# Patient Record
Sex: FEMALE | Race: WHITE | Employment: FULL TIME | URBAN - METROPOLITAN AREA
[De-identification: names, ages, dates, MRNs, and addresses within clinical notes are randomized per-mention and may not be internally consistent; named-entity substitution may affect disease eponyms.]

---

## 2017-11-25 ENCOUNTER — APPOINTMENT (OUTPATIENT)
Dept: GENERAL RADIOLOGY | Facility: HOSPITAL | Age: 52
End: 2017-11-25
Attending: EMERGENCY MEDICINE
Payer: COMMERCIAL

## 2017-11-25 ENCOUNTER — HOSPITAL ENCOUNTER (EMERGENCY)
Facility: HOSPITAL | Age: 52
Discharge: HOME | End: 2017-11-25
Attending: EMERGENCY MEDICINE
Payer: COMMERCIAL

## 2017-11-25 VITALS
OXYGEN SATURATION: 94 % | BODY MASS INDEX: 31 KG/M2 | SYSTOLIC BLOOD PRESSURE: 142 MMHG | TEMPERATURE: 99 F | WEIGHT: 220 LBS | DIASTOLIC BLOOD PRESSURE: 84 MMHG | HEART RATE: 80 BPM | RESPIRATION RATE: 17 BRPM

## 2017-11-25 DIAGNOSIS — J20.9 ACUTE BRONCHITIS, UNSPECIFIED ORGANISM: Primary | ICD-10-CM

## 2017-11-25 PROCEDURE — 71020 XR CHEST PA + LAT CHEST (CPT=71020): CPT | Performed by: EMERGENCY MEDICINE

## 2017-11-25 PROCEDURE — 94640 AIRWAY INHALATION TREATMENT: CPT

## 2017-11-25 PROCEDURE — 99284 EMERGENCY DEPT VISIT MOD MDM: CPT

## 2017-11-25 RX ORDER — PREDNISONE 20 MG/1
60 TABLET ORAL ONCE
Status: COMPLETED | OUTPATIENT
Start: 2017-11-25 | End: 2017-11-25

## 2017-11-25 RX ORDER — PREDNISONE 20 MG/1
40 TABLET ORAL DAILY
Qty: 10 TABLET | Refills: 0 | Status: SHIPPED | OUTPATIENT
Start: 2017-11-25 | End: 2017-11-30

## 2017-11-25 RX ORDER — IPRATROPIUM BROMIDE AND ALBUTEROL SULFATE 2.5; .5 MG/3ML; MG/3ML
3 SOLUTION RESPIRATORY (INHALATION) ONCE
Status: COMPLETED | OUTPATIENT
Start: 2017-11-25 | End: 2017-11-25

## 2017-11-25 RX ORDER — BENZONATATE 100 MG/1
100 CAPSULE ORAL 3 TIMES DAILY PRN
Qty: 30 CAPSULE | Refills: 0 | Status: SHIPPED | OUTPATIENT
Start: 2017-11-25 | End: 2017-12-25

## 2017-11-25 RX ORDER — ALBUTEROL SULFATE 90 UG/1
2 AEROSOL, METERED RESPIRATORY (INHALATION) EVERY 6 HOURS PRN
Qty: 1 INHALER | Refills: 0 | Status: SHIPPED | OUTPATIENT
Start: 2017-11-25

## 2017-11-25 NOTE — RESPIRATORY THERAPY NOTE
Pt seen for Duoned BD tx. Scattered whz pre, Improved post. PF-200/215, MDI instruction and spacer device given.

## 2017-11-25 NOTE — ED PROVIDER NOTES
Patient Seen in: [facility] Emergency Department    History   Patient presents with:  Dyspnea LARRY SOB (respiratory)    Stated Complaint:     HPI    Patient is a 51-year-old female comes emergency room for evaluation of upper respiratory symptoms.   P Radiculitis 5/6/2009    LUE   • Sinusitis 1/4/2012   • Spinal stenosis in cervical region 5/19/2009   • Viral gastroenteritis 12/8/2005       Past Surgical History:  10/7/10: OTHER SURGICAL HISTORY      Comment: Cystoscopy, L RPG, L URS, L stent, Dr. oJse Del Angel PATIENT STATED HISTORY: (As transcribed by Technologist)  She has had a cough and fever for the past couple of days. She also states she has a runny nose. FINDINGS:  Normal heart size and pulmonary vascularity. No pleural effusion or pneumothorax.  No l visit.     Follow-up:  Gilberto Hammans, MD  [address] [phone]      As needed        Medications Prescribed:  Current Discharge Medication List    START taking these medications    Albuterol Sulfate HFA (PROAIR HFA) 1

## 2017-11-25 NOTE — ED INITIAL ASSESSMENT (HPI)
Patient arrives from home with c/o cough, nasal congestion, fever, and body aches starting on Wednesday

## 2024-11-15 ENCOUNTER — OFFICE VISIT (OUTPATIENT)
Dept: FAMILY MEDICINE CLINIC | Facility: CLINIC | Age: 59
End: 2024-11-15
Payer: COMMERCIAL

## 2024-11-15 VITALS
DIASTOLIC BLOOD PRESSURE: 86 MMHG | TEMPERATURE: 99 F | HEART RATE: 87 BPM | OXYGEN SATURATION: 98 % | SYSTOLIC BLOOD PRESSURE: 128 MMHG | RESPIRATION RATE: 16 BRPM | WEIGHT: 245 LBS

## 2024-11-15 DIAGNOSIS — L73.9 FOLLICULITIS: Primary | ICD-10-CM

## 2024-11-15 PROCEDURE — 3074F SYST BP LT 130 MM HG: CPT | Performed by: NURSE PRACTITIONER

## 2024-11-15 PROCEDURE — 3079F DIAST BP 80-89 MM HG: CPT | Performed by: NURSE PRACTITIONER

## 2024-11-15 PROCEDURE — 99202 OFFICE O/P NEW SF 15 MIN: CPT | Performed by: NURSE PRACTITIONER

## 2024-11-15 RX ORDER — DOXYCYCLINE HYCLATE 100 MG
100 TABLET ORAL 2 TIMES DAILY
Qty: 14 TABLET | Refills: 0 | Status: SHIPPED | OUTPATIENT
Start: 2024-11-15 | End: 2024-11-22
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
## (undated) NOTE — ED AVS SNAPSHOT
Radha Birks   MRN: [id number]    Department:  [facility] Emergency Department   Date of Visit:  11/25/2017           Disclosure     Insurance plans vary and the physician(s) referred by the ER may not be covered by your plan.  Please contact your tell this physician (or your personal doctor if your instructions are to return to your personal doctor) about any new or lasting problems. The primary care or specialist physician will see patients referred from the [facility] Emergency Department.  Severo Pastures